# Patient Record
Sex: FEMALE | Race: WHITE | NOT HISPANIC OR LATINO | Employment: FULL TIME | URBAN - METROPOLITAN AREA
[De-identification: names, ages, dates, MRNs, and addresses within clinical notes are randomized per-mention and may not be internally consistent; named-entity substitution may affect disease eponyms.]

---

## 2017-10-27 ENCOUNTER — TRANSCRIBE ORDERS (OUTPATIENT)
Dept: ADMINISTRATIVE | Facility: HOSPITAL | Age: 47
End: 2017-10-27

## 2017-10-27 DIAGNOSIS — Z12.31 VISIT FOR SCREENING MAMMOGRAM: Primary | ICD-10-CM

## 2017-12-07 ENCOUNTER — HOSPITAL ENCOUNTER (OUTPATIENT)
Dept: MAMMOGRAPHY | Facility: HOSPITAL | Age: 47
Discharge: HOME | End: 2017-12-07
Attending: OBSTETRICS & GYNECOLOGY | Admitting: OBSTETRICS & GYNECOLOGY

## 2017-12-07 DIAGNOSIS — Z12.31 VISIT FOR SCREENING MAMMOGRAM: ICD-10-CM

## 2017-12-07 PROCEDURE — 77063 BREAST TOMOSYNTHESIS BI: CPT

## 2017-12-07 PROCEDURE — G0202 SCR MAMMO BI INCL CAD: HCPCS

## 2017-12-08 PROCEDURE — 77063 BREAST TOMOSYNTHESIS BI: CPT | Performed by: RADIOLOGY

## 2017-12-08 PROCEDURE — 77067 SCR MAMMO BI INCL CAD: CPT | Performed by: RADIOLOGY

## 2018-11-09 ENCOUNTER — TRANSCRIBE ORDERS (OUTPATIENT)
Dept: ADMINISTRATIVE | Facility: HOSPITAL | Age: 48
End: 2018-11-09

## 2018-11-09 DIAGNOSIS — Z12.31 VISIT FOR SCREENING MAMMOGRAM: Primary | ICD-10-CM

## 2018-12-26 ENCOUNTER — HOSPITAL ENCOUNTER (OUTPATIENT)
Dept: MAMMOGRAPHY | Facility: HOSPITAL | Age: 48
Discharge: HOME | End: 2018-12-26
Attending: OBSTETRICS & GYNECOLOGY | Admitting: OBSTETRICS & GYNECOLOGY

## 2018-12-26 DIAGNOSIS — Z12.31 VISIT FOR SCREENING MAMMOGRAM: ICD-10-CM

## 2018-12-26 PROCEDURE — 77067 SCR MAMMO BI INCL CAD: CPT | Performed by: RADIOLOGY

## 2018-12-26 PROCEDURE — 77067 SCR MAMMO BI INCL CAD: CPT

## 2018-12-26 PROCEDURE — 77063 BREAST TOMOSYNTHESIS BI: CPT | Performed by: RADIOLOGY

## 2018-12-26 PROCEDURE — 77063 BREAST TOMOSYNTHESIS BI: CPT

## 2019-12-12 ENCOUNTER — TRANSCRIBE ORDERS (OUTPATIENT)
Dept: ADMINISTRATIVE | Facility: HOSPITAL | Age: 49
End: 2019-12-12

## 2019-12-12 DIAGNOSIS — Z12.31 VISIT FOR SCREENING MAMMOGRAM: Primary | ICD-10-CM

## 2020-02-19 ENCOUNTER — HOSPITAL ENCOUNTER (OUTPATIENT)
Dept: MAMMOGRAPHY | Facility: HOSPITAL | Age: 50
Discharge: HOME | End: 2020-02-19
Admitting: OBSTETRICS & GYNECOLOGY

## 2020-02-19 DIAGNOSIS — Z12.31 VISIT FOR SCREENING MAMMOGRAM: ICD-10-CM

## 2020-02-19 PROCEDURE — 77063 BREAST TOMOSYNTHESIS BI: CPT | Performed by: RADIOLOGY

## 2020-02-19 PROCEDURE — 77063 BREAST TOMOSYNTHESIS BI: CPT

## 2020-02-19 PROCEDURE — 77067 SCR MAMMO BI INCL CAD: CPT

## 2020-02-19 PROCEDURE — 77067 SCR MAMMO BI INCL CAD: CPT | Performed by: RADIOLOGY

## 2020-03-25 ENCOUNTER — HOSPITAL ENCOUNTER (OUTPATIENT)
Dept: MAMMOGRAPHY | Facility: HOSPITAL | Age: 50
Discharge: HOME | End: 2020-03-25
Admitting: RADIOLOGY

## 2020-03-25 DIAGNOSIS — R92.8 ABNORMAL MAMMOGRAM: ICD-10-CM

## 2020-03-25 PROCEDURE — 77061 BREAST TOMOSYNTHESIS UNI: CPT | Performed by: RADIOLOGY

## 2020-03-25 PROCEDURE — 77065 DX MAMMO INCL CAD UNI: CPT

## 2020-03-25 PROCEDURE — 77065 DX MAMMO INCL CAD UNI: CPT | Performed by: RADIOLOGY

## 2020-03-25 PROCEDURE — G0279 TOMOSYNTHESIS, MAMMO: HCPCS

## 2021-06-15 ENCOUNTER — TRANSCRIBE ORDERS (OUTPATIENT)
Dept: ADMINISTRATIVE | Facility: HOSPITAL | Age: 51
End: 2021-06-15

## 2021-06-15 DIAGNOSIS — Z12.31 VISIT FOR SCREENING MAMMOGRAM: Primary | ICD-10-CM

## 2021-07-27 ENCOUNTER — HOSPITAL ENCOUNTER (OUTPATIENT)
Dept: MAMMOGRAPHY | Facility: HOSPITAL | Age: 51
Discharge: HOME | End: 2021-07-27
Admitting: OBSTETRICS & GYNECOLOGY

## 2021-07-27 DIAGNOSIS — Z12.31 VISIT FOR SCREENING MAMMOGRAM: ICD-10-CM

## 2021-07-27 PROCEDURE — 77063 BREAST TOMOSYNTHESIS BI: CPT | Performed by: RADIOLOGY

## 2021-07-27 PROCEDURE — 77067 SCR MAMMO BI INCL CAD: CPT | Performed by: RADIOLOGY

## 2021-07-27 PROCEDURE — 77063 BREAST TOMOSYNTHESIS BI: CPT

## 2021-07-27 PROCEDURE — 77067 SCR MAMMO BI INCL CAD: CPT

## 2021-08-23 ENCOUNTER — HOSPITAL ENCOUNTER (OUTPATIENT)
Dept: MAMMOGRAPHY | Facility: HOSPITAL | Age: 51
Discharge: HOME | End: 2021-08-23

## 2021-08-23 ENCOUNTER — HOSPITAL ENCOUNTER (OUTPATIENT)
Dept: ULTRASOUND IMAGING | Facility: HOSPITAL | Age: 51
Discharge: HOME | End: 2021-08-23

## 2021-08-23 DIAGNOSIS — R92.8 ABNORMAL MAMMOGRAM: ICD-10-CM

## 2021-08-23 PROCEDURE — G0279 TOMOSYNTHESIS, MAMMO: HCPCS

## 2021-08-23 PROCEDURE — 76642 ULTRASOUND BREAST LIMITED: CPT

## 2021-08-23 PROCEDURE — 77065 DX MAMMO INCL CAD UNI: CPT

## 2021-08-23 PROCEDURE — 77065 DX MAMMO INCL CAD UNI: CPT | Performed by: RADIOLOGY

## 2021-08-23 PROCEDURE — 77061 BREAST TOMOSYNTHESIS UNI: CPT | Performed by: RADIOLOGY

## 2021-08-23 PROCEDURE — 76642 ULTRASOUND BREAST LIMITED: CPT | Performed by: RADIOLOGY

## 2022-07-26 ENCOUNTER — TRANSCRIBE ORDERS (OUTPATIENT)
Dept: ADMINISTRATIVE | Facility: HOSPITAL | Age: 52
End: 2022-07-26

## 2022-07-26 DIAGNOSIS — Z12.31 VISIT FOR SCREENING MAMMOGRAM: Primary | ICD-10-CM

## 2022-08-27 ENCOUNTER — APPOINTMENT (OUTPATIENT)
Dept: MAMMOGRAPHY | Facility: HOSPITAL | Age: 52
End: 2022-08-27

## 2022-09-20 ENCOUNTER — HOSPITAL ENCOUNTER (OUTPATIENT)
Dept: MAMMOGRAPHY | Facility: HOSPITAL | Age: 52
Discharge: HOME | End: 2022-09-20
Admitting: OBSTETRICS & GYNECOLOGY

## 2022-09-20 DIAGNOSIS — Z12.31 VISIT FOR SCREENING MAMMOGRAM: ICD-10-CM

## 2022-09-20 PROCEDURE — 77067 SCR MAMMO BI INCL CAD: CPT | Performed by: RADIOLOGY

## 2022-09-20 PROCEDURE — 77063 BREAST TOMOSYNTHESIS BI: CPT | Performed by: RADIOLOGY

## 2022-09-20 PROCEDURE — 77063 BREAST TOMOSYNTHESIS BI: CPT

## 2022-09-20 PROCEDURE — 77067 SCR MAMMO BI INCL CAD: CPT

## 2023-08-09 ENCOUNTER — TRANSCRIBE ORDERS (OUTPATIENT)
Dept: ADMINISTRATIVE | Facility: HOSPITAL | Age: 53
End: 2023-08-09
Payer: COMMERCIAL

## 2023-08-09 DIAGNOSIS — Z12.31 VISIT FOR SCREENING MAMMOGRAM: Primary | ICD-10-CM

## 2023-11-01 ENCOUNTER — OFFICE VISIT (OUTPATIENT)
Dept: ORTHOPEDIC SURGERY | Facility: CLINIC | Age: 53
End: 2023-11-01
Payer: COMMERCIAL

## 2023-11-01 VITALS
SYSTOLIC BLOOD PRESSURE: 114 MMHG | DIASTOLIC BLOOD PRESSURE: 82 MMHG | BODY MASS INDEX: 24.36 KG/M2 | HEIGHT: 65 IN | WEIGHT: 146.2 LBS

## 2023-11-01 DIAGNOSIS — M25.512 ACUTE PAIN OF LEFT SHOULDER: Primary | ICD-10-CM

## 2023-11-01 PROCEDURE — 99203 OFFICE O/P NEW LOW 30 MIN: CPT | Performed by: ORTHOPAEDIC SURGERY

## 2023-11-01 RX ORDER — CELECOXIB 200 MG/1
CAPSULE ORAL
COMMUNITY
Start: 2023-10-19

## 2023-11-01 NOTE — PROGRESS NOTES
"    [facility] [place] [facility] Note        Subjective     Pain of the Left Shoulder      HPI    Gina Hernandez is a 53 y.o. female.  Her dog jerked her left arm 3 weeks ago she injured her left shoulder.  She had x-rays.  She works department [employer].  She lives in [place].  No previous injury.  She has pain in the posterior aspect of the shoulder.    History reviewed. No pertinent past medical history.   Past Surgical History:   Procedure Laterality Date   • HYSTERECTOMY  2010   • OOPHORECTOMY Bilateral 2010      Family History   Problem Relation Age of Onset   • Ovarian cancer Maternal Grandmother         DX AGE UNKNOWN   • Cancer Mother         Renal cancer   • Cancer Father         Prostrate cancer   • BRCA 1/2 Neg Hx    • Breast cancer Neg Hx    • Colon cancer Neg Hx    • Endometrial cancer Neg Hx      Social History     Socioeconomic History   • Marital status:    Tobacco Use   • Smoking status: Former     Packs/day: 0.50     Years: 10.00     Additional pack years: 0.00     Total pack years: 5.00     Types: Cigarettes   Substance and Sexual Activity   • Alcohol use: Yes     Alcohol/week: 2.0 standard drinks of alcohol     Types: 2 Cans of beer per week   • Drug use: Never   • Sexual activity: Yes     Partners: Male     Birth control/protection: None     Comment: Total Historectomy at age 40      Current Outpatient Medications on File Prior to Visit   Medication Sig Dispense Refill   • celecoxib (CeleBREX) 200 MG capsule TAKE 1 CAPSULE BY MOUTH TWICE DAILY WITH FOOD AS NEEDED FOR PAIN       No current facility-administered medications on file prior to visit.      No Known Allergies       Review of Systems     I reviewed the patient's chief complaint, history of present illness, review of systems, past medical history, surgical history, family history, social history, medications and allergy list.        Objective      Physical Exam  /82   Ht 163.8 cm (64.5\")   Wt 66.3 kg (146 lb 3.2 oz)   " BMI 24.71 kg/m²     Body mass index is 24.71 kg/m².    General  Mental Status - alert  General Appearance - cooperative, well groomed, not in acute distress  Orientation - Oriented X3  Build & Nutrition - well developed and well nourished  Posture - normal posture  Gait - normal gait       Ortho Exam  Left shoulder positive impingement.  Full range of motion.  Supraspinatus strength 4-5.  Positive drop arm test.    Imaging/Studies  Imaging Results (Last 24 Hours)       ** No results found for the last 24 hours. **        I viewed in person interpreted x-rays from October 20 which are negative      Assessment    Assessment:  1. Acute pain of left shoulder        Plan:  Continue over-the-counter medication as needed for discomfort  I have ordered physical therapy.  She will do that in [place].  I have ordered an MRI of the left shoulder rule out rotator cuff tear.        Castro Mondragon MD  11/01/23  15:22 EDT      Dictated Utilizing Dragon Dictation.

## 2023-11-09 ENCOUNTER — HOSPITAL ENCOUNTER (OUTPATIENT)
Dept: MAMMOGRAPHY | Facility: HOSPITAL | Age: 53
Discharge: HOME | End: 2023-11-09
Admitting: OBSTETRICS & GYNECOLOGY
Payer: COMMERCIAL

## 2023-11-09 DIAGNOSIS — Z12.31 VISIT FOR SCREENING MAMMOGRAM: ICD-10-CM

## 2023-11-09 PROCEDURE — 77063 BREAST TOMOSYNTHESIS BI: CPT

## 2023-11-09 PROCEDURE — 77067 SCR MAMMO BI INCL CAD: CPT

## 2023-11-15 DIAGNOSIS — M25.512 ACUTE PAIN OF LEFT SHOULDER: ICD-10-CM

## 2023-11-20 ENCOUNTER — OFFICE VISIT (OUTPATIENT)
Dept: ORTHOPEDIC SURGERY | Facility: CLINIC | Age: 53
End: 2023-11-20
Payer: COMMERCIAL

## 2023-11-20 VITALS
WEIGHT: 146.16 LBS | DIASTOLIC BLOOD PRESSURE: 82 MMHG | SYSTOLIC BLOOD PRESSURE: 118 MMHG | BODY MASS INDEX: 24.95 KG/M2 | HEIGHT: 64 IN

## 2023-11-20 DIAGNOSIS — M25.512 ACUTE PAIN OF LEFT SHOULDER: Primary | ICD-10-CM

## 2023-11-20 DIAGNOSIS — M75.92 SUPRASPINATUS TENDINITIS, LEFT: ICD-10-CM

## 2023-11-20 DIAGNOSIS — R59.0 AXILLARY LYMPHADENOPATHY: ICD-10-CM

## 2023-11-20 PROCEDURE — 20610 DRAIN/INJ JOINT/BURSA W/O US: CPT | Performed by: ORTHOPAEDIC SURGERY

## 2023-11-20 PROCEDURE — 99214 OFFICE O/P EST MOD 30 MIN: CPT | Performed by: ORTHOPAEDIC SURGERY

## 2023-11-20 RX ORDER — BUPIVACAINE HYDROCHLORIDE 5 MG/ML
4 INJECTION, SOLUTION EPIDURAL; INTRACAUDAL
Status: COMPLETED | OUTPATIENT
Start: 2023-11-20 | End: 2023-11-20

## 2023-11-20 RX ORDER — LIDOCAINE HYDROCHLORIDE 10 MG/ML
4 INJECTION, SOLUTION EPIDURAL; INFILTRATION; INTRACAUDAL; PERINEURAL
Status: COMPLETED | OUTPATIENT
Start: 2023-11-20 | End: 2023-11-20

## 2023-11-20 RX ORDER — MELOXICAM 15 MG/1
TABLET ORAL
Qty: 90 TABLET | Refills: 2 | Status: SHIPPED | OUTPATIENT
Start: 2023-11-20

## 2023-11-20 RX ORDER — TRIAMCINOLONE ACETONIDE 40 MG/ML
40 INJECTION, SUSPENSION INTRA-ARTICULAR; INTRAMUSCULAR
Status: COMPLETED | OUTPATIENT
Start: 2023-11-20 | End: 2023-11-20

## 2023-11-20 RX ADMIN — BUPIVACAINE HYDROCHLORIDE 4 ML: 5 INJECTION, SOLUTION EPIDURAL; INTRACAUDAL at 15:54

## 2023-11-20 RX ADMIN — TRIAMCINOLONE ACETONIDE 40 MG: 40 INJECTION, SUSPENSION INTRA-ARTICULAR; INTRAMUSCULAR at 15:54

## 2023-11-20 RX ADMIN — LIDOCAINE HYDROCHLORIDE 4 ML: 10 INJECTION, SOLUTION EPIDURAL; INFILTRATION; INTRACAUDAL; PERINEURAL at 15:54

## 2023-11-20 NOTE — PROGRESS NOTES
Procedure   - Large Joint Arthrocentesis: L subacromial bursa on 11/20/2023 3:54 PM  Indications: pain  Details: 21 G needle, posterior approach  Medications: 4 mL lidocaine PF 1% 1 %; 40 mg triamcinolone acetonide 40 MG/ML; 4 mL bupivacaine (PF) 0.5 %  Outcome: tolerated well, no immediate complications  Procedure, treatment alternatives, risks and benefits explained, specific risks discussed. Consent was given by the patient. Immediately prior to procedure a time out was called to verify the correct patient, procedure, equipment, support staff and site/side marked as required. Patient was prepped and draped in the usual sterile fashion.

## 2023-11-20 NOTE — PROGRESS NOTES
"    [facility] Note        Subjective     CC: Follow-up (3 week (MRI) follow-up: acute pain of left shoulder)      HPI    Gina Hernandez is a 53 y.o. female.  Her dog jerked her left arm 3 weeks ago she injured her left shoulder.  She had x-rays.  She works department [employer].  She lives in [place].  No previous injury.  She has pain in the posterior aspect of the shoulder.      Overall, patient's symptoms are worse.    ROS:    Constiutional:Pt denies fever, chills, nausea, or vomiting.  MSK:as above        Objective      Past Medical History  History reviewed. No pertinent past medical history.  Social History     Socioeconomic History    Marital status:    Tobacco Use    Smoking status: Former     Packs/day: 0.50     Years: 10.00     Additional pack years: 0.00     Total pack years: 5.00     Types: Cigarettes   Substance and Sexual Activity    Alcohol use: Yes     Alcohol/week: 2.0 standard drinks of alcohol     Types: 2 Cans of beer per week    Drug use: Never    Sexual activity: Yes     Partners: Male     Birth control/protection: None     Comment: Total Historectomy at age 40          Physical Exam  /82   Ht 163.8 cm (64.49\")   Wt 66.3 kg (146 lb 2.6 oz)   BMI 24.71 kg/m²     Body mass index is 24.71 kg/m².    Patient is well nourished and well developed.        Ortho Exam  Left shoulder with decreased motion and positive impingement.  Tender biceps tendon.  No appreciable lymphadenopathy on exam  Imaging/Labs/EMG Reviewed:  Imaging Results (Last 24 Hours)       ** No results found for the last 24 hours. **        I viewed and personally interpreted the MRI of the left shoulder from November 14 which shows rotator cuff tendinopathy, partial subscapularis tear and axillary lymphadenopathy      Assessment    Assessment:  1. Acute pain of left shoulder    2. Supraspinatus tendinitis, left    3. Axillary lymphadenopathy        Plan:  I prescribed Mobic for pain and " inflammation  I injected her left shoulder subacromial space with cortisone. I discussed with the patient the potential benefits of performing a therapeutic injection of the cortisone as well as potential risks including but not limited to infection, swelling, pain, bleeding, bruising, nerve/vessel damage, skin color changes, transient elevation in blood glucose levels, and fat atrophy. After informed consent and verifying correct patient, procedure site, and type of procedure, the area was prepped with Hibiclens, ethyl chloride was used to numb the skin. The patient tolerated the procedure well. There were no complications.  I ordered physical therapy  She just had a mammogram and therefore no apparent breast issues as the cause of the axillary lymphadenopathy      Castro Mondragon MD  11/20/23  15:53 EST      Dictated Utilizing Dragon Dictation.

## 2023-12-13 ENCOUNTER — OFFICE VISIT (OUTPATIENT)
Dept: ORTHOPEDIC SURGERY | Facility: CLINIC | Age: 53
End: 2023-12-13
Payer: COMMERCIAL

## 2023-12-13 VITALS
WEIGHT: 146 LBS | DIASTOLIC BLOOD PRESSURE: 78 MMHG | HEIGHT: 64 IN | BODY MASS INDEX: 24.92 KG/M2 | SYSTOLIC BLOOD PRESSURE: 118 MMHG

## 2023-12-13 DIAGNOSIS — M25.512 ACUTE PAIN OF LEFT SHOULDER: Primary | ICD-10-CM

## 2023-12-13 DIAGNOSIS — M75.92 SUPRASPINATUS TENDINITIS, LEFT: ICD-10-CM

## 2023-12-13 PROCEDURE — 99213 OFFICE O/P EST LOW 20 MIN: CPT | Performed by: ORTHOPAEDIC SURGERY

## 2023-12-13 NOTE — PROGRESS NOTES
"    [facility] [place] [facility] Note        Subjective     CC: Follow-up (3 week follow up - Acute pain of left shoulder)      HPI    Gina Hernandez is a 53 y.o. female. Her dog jerked her left arm 3 weeks ago she injured her left shoulder.  She had x-rays.  She works department [employer].  She lives in [place].  No previous injury.  She has pain in the posterior aspect of the shoulder.       Overall, patient's symptoms are better.  She was [facility] in [place].    ROS:    Constiutional:Pt denies fever, chills, nausea, or vomiting.  MSK:as above        Objective      Past Medical History  No past medical history on file.  Social History     Socioeconomic History    Marital status:    Tobacco Use    Smoking status: Former     Packs/day: 0.50     Years: 10.00     Additional pack years: 0.00     Total pack years: 5.00     Types: Cigarettes   Substance and Sexual Activity    Alcohol use: Yes     Alcohol/week: 2.0 standard drinks of alcohol     Types: 2 Cans of beer per week    Drug use: Never    Sexual activity: Yes     Partners: Male     Birth control/protection: None     Comment: Total Historectomy at age 40          Physical Exam  /78   Ht 163.8 cm (64.49\")   Wt 66.2 kg (146 lb)   BMI 24.68 kg/m²     Body mass index is 24.68 kg/m².    Patient is well nourished and well developed.        Ortho Exam  Left shoulder has full forward elevation and abduction.  She lacks some internal rotation.    Imaging/Labs/EMG Reviewed:  Imaging Results (Last 24 Hours)       ** No results found for the last 24 hours. **             I viewed and personally interpreted the MRI of the left shoulder from November 14 which shows rotator cuff tendinopathy, partial subscapularis tear and axillary lymphadenopathy       Assessment    Assessment:  1. Acute pain of left shoulder    2. Supraspinatus tendinitis, left        Plan:  Recommend over the counter anti-inflammatories for pain and/or swelling  I have ordered " more physical therapy.  She will follow-up in a month.  She should heal without surgical intervention.      Castro Mondragon MD  12/13/23  15:46 EST      Dictated Utilizing Dragon Dictation.

## 2024-09-09 ENCOUNTER — HOSPITAL ENCOUNTER (EMERGENCY)
Dept: HOSPITAL 22 - UTC | Age: 54
Discharge: HOME | End: 2024-09-09
Payer: COMMERCIAL

## 2024-09-09 VITALS — OXYGEN SATURATION: 100 % | HEART RATE: 74 BPM | SYSTOLIC BLOOD PRESSURE: 126 MMHG | DIASTOLIC BLOOD PRESSURE: 74 MMHG

## 2024-09-09 VITALS
SYSTOLIC BLOOD PRESSURE: 123 MMHG | DIASTOLIC BLOOD PRESSURE: 68 MMHG | TEMPERATURE: 98.6 F | HEART RATE: 82 BPM | RESPIRATION RATE: 16 BRPM | OXYGEN SATURATION: 100 %

## 2024-09-09 VITALS
TEMPERATURE: 98.6 F | SYSTOLIC BLOOD PRESSURE: 120 MMHG | DIASTOLIC BLOOD PRESSURE: 59 MMHG | RESPIRATION RATE: 16 BRPM | OXYGEN SATURATION: 98 % | HEART RATE: 80 BPM

## 2024-09-09 VITALS — BODY MASS INDEX: 23.8 KG/M2 | BODY MASS INDEX: 29.8 KG/M2

## 2024-09-09 VITALS
OXYGEN SATURATION: 98 % | RESPIRATION RATE: 20 BRPM | HEART RATE: 69 BPM | TEMPERATURE: 98.6 F | SYSTOLIC BLOOD PRESSURE: 124 MMHG | DIASTOLIC BLOOD PRESSURE: 75 MMHG

## 2024-09-09 VITALS — OXYGEN SATURATION: 100 % | HEART RATE: 79 BPM | DIASTOLIC BLOOD PRESSURE: 68 MMHG | SYSTOLIC BLOOD PRESSURE: 123 MMHG

## 2024-09-09 DIAGNOSIS — S61.101A: Primary | ICD-10-CM
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
## 2024-09-09 PROCEDURE — 73130 X-RAY EXAM OF HAND: CPT

## 2024-09-09 PROCEDURE — 99283 EMERGENCY DEPT VISIT LOW MDM: CPT

## 2024-09-09 PROCEDURE — 11760 REPAIR OF NAIL BED: CPT

## 2024-09-09 RX ADMIN — LIDOCAINE HYDROCHLORIDE AND EPINEPHRINE 1 ML: 10; 10 INJECTION, SOLUTION INFILTRATION; PERINEURAL at 17:19

## 2024-09-09 NOTE — XR_ITS
PROCEDURE INFORMATION:  
Exam: XR Right Hand  
Exam date and time: 9/9/2024 4:49 PM  
Age: 54 years old  
Clinical indication: Pain; Finger(s); Right; Additional info: Distal  
thumb  
injury  
 
TECHNIQUE:  
Imaging protocol: Radiologic exam of the right hand.  
Views: 3 or more views.  
 
COMPARISON:  
No relevant prior studies available.  
 
FINDINGS:  
Bones/joints: Normal.  
Soft tissues: Normal.  
 
IMPRESSION:  
No acute findings.  
 
Electronically signed by João Ibarra MD at 09/09/2024 17:16

## 2024-09-09 NOTE — PC.NURSE
PATIENT SENT TO ER PER KENNEDY CONTE FOR FURTHER EVALUATION. REPORT GIVEN TO DR. EDWARDS PER KENNEDY CONTE. PATIENT AMBULATED TO ER WITH [facility] STAFF AT THIS TIME

## 2024-09-09 NOTE — ED_ITS
Discharge Plan    
Disposition    
Patient Disposition: Home, Self-Care    
    
Condition: Good    
    
Referrals    
Follow up/Referrals:    
Eddi Angulo [Primary Care Provider] - See instructions    
    
Activity Restrictions/Add. Instructions    
Additional Instructions/Restrictions:    
You can follow-up with your PCP or return to ER for any worsening signs or   
symptoms including redness swelling increasing pain or drainage.  Your nail will  
take approximately 9 months to fully regrow.  You can take Tylenol alternating   
with Motrin for any symptomatic pain.  I provided you with a splint while   
everything heals and you may wear as tolerated.    
    
Clinical Impressions    
Clinical Impression:    
 Avulsion of nail of right thumb    
    
    
Instructions    
Patient Instructions:  DI for Nail Avulsion Injury    
    
Print Language    
Print Language: English    
    
Discharge    
ED Provider: Kirt Manrique    
    
    
General Adult HPI    
<Kirt Manrique MD - Last Filed: 09/09/24 21:52>    
General    
Chief complaint: Skin/Abscess/Foreign Body    
Stated complaint: AO 09/08/24 2200 injury right thumb    
Time Seen by Provider: 09/09/24 16:15    
Mode of Arrival: Ambulatory    
Source of Information: Patient    
Limitations: No Limitations    
Description of Symptoms (Recalled from ER Triage Doc. by RN): PATIENT STATES SHE  
HIT HER RIGHT THUMNAIL ON THE METAL POLE OF A TRAMPOLINE LAST NIGHT AND IT   
PARTIALLY TORE HER NAIL OFF    
History of Present Illness    
HPI narrative:     
Patient is 54-year-old with no pertinent past medical history, right-handed who   
presents emergency department for evaluation of trauma to her thumb.  Patient   
was being pulled along by her dog when her dog and advertently went under a   
trampoline and her thumbnail got caught on the middle aspect of the trampoline   
everting it with significant pain.  She has noticed that it was slightly   
dislodged this morning and oozing blood under the nail causing her to present   
here for continued evaluation.  No other acute complaints    
Related Data    
                                    Allergies    
    
    
    
Allergy/AdvReac Type Severity Reaction Status Date / Time    
     
No Known Allergies Allergy   Verified 09/09/24 16:16    
    
    
    
    
PFS    
<Kirt Manrique MD - Last Filed: 09/09/24 21:52>    
[facility]    
Disclaimer:     
The information contained in this section may have been updated after the   
patient was seen, as this information can be updated by other users.    
    
Medical History (Updated 09/09/24 @ 17:45 by PAULINA Silva)    
    
Skin cancer    
Asthma    
    
    
Surgical History (Updated 09/09/24 @ 16:15 by Keeley Montoya RN)    
    
History of appendectomy    
History of tonsillectomy    
History of hysterectomy    
    
    
Social History (Updated 09/09/24 @ 16:28 by DG Dos Santos)    
Smoking Status:  Never smoker     
alcohol intake:  never     
current occupational status:  employed     
Travel in the last 8 weeks:  None     
    
    
    
<Kirt Manrique MD - Last Filed: 09/09/24 21:52>    
ROS Obtained: Yes Systems reviewed as appropriate & no additional complaints   
except as documented    
    
Physical Exam    
<Kirt Manrique MD - Last Filed: 09/09/24 21:52>    
General    
General appearance: alert and in no apparent distress    
Head    
Head exam: atraumatic and normocephalic    
Eye    
Eye exam: Present PERRL and EOMI    
ENT    
ENT exam: Present mucous membranes moist    
Neck    
Neck exam: Present normal inspection    
Chest    
Chest inspection: Present normal inspection and symmetric chest wall rise    
Respiratory    
Respiratory exam: Absent respiratory distress    
Cardiovascular    
Cardiovascular exam: Present regular rate and normal rhythm    
Abdominal Exam    
Abdominal exam: Present soft    
Extremities Exam    
Extremities exam: Present other (Synthetic nail over top of native nail right   
thumb, partially avulsed, oozing blood under the nail.  Sensation intact to   
light touch distally, capillary refill preserved right thumb, no significant   
tenderness with the exception of the nail.)    
Neurological Exam    
Neurological exam: Present alert    
Psychiatric    
Psychiatric exam: Present normal affect    
Skin    
Skin exam: Present warm and dry    
    
Medical Decision Making    
<Kirt Manrique MD - Last Filed: 09/09/24 21:52>    
[facility] Inquiry    
Pt receiving controlled substance: No    
Vital Signs:     
    
                                            
    
    
    
 09/09/24    
16:00 09/09/24    
16:31 09/09/24    
17:00    
     
Temperature 98.6 F 98.6 F     
     
Temperature Source Oral Oral     
     
Pulse Rate   74    
     
Pulse Rate [Left Brachial] 69 80     
     
Respiratory Rate 20 16     
     
Blood Pressure   126/74    
     
Blood Pressure [Left Arm] 124/75 120/59 L     
     
Blood Pressure Mean [Left Arm] 91 79     
     
Blood Pressure Source [Left Arm] Automatic Cuff Automatic Cuff     
     
Blood Pressure Position [Left Arm] Sitting      
     
02 Sat by Pulse Oximetry 98 98 100    
     
Oxygen Delivery Method Room Air Room Air Room Air    
    
    
    
    
 09/09/24    
17:30 09/09/24    
17:50    
     
Temperature  98.6 F    
     
Temperature Source      
     
Pulse Rate 79 82    
     
Pulse Rate [Left Brachial]      
     
Respiratory Rate  16    
     
Blood Pressure 123/68 123/68    
     
Blood Pressure [Left Arm]      
     
Blood Pressure Mean [Left Arm]      
     
Blood Pressure Source [Left Arm]      
     
Blood Pressure Position [Left Arm]      
     
02 Sat by Pulse Oximetry 100     
     
Oxygen Delivery Method Room Air Room Air    
    
    
    
    
Orders (Tests/Meds):     
    
                                 ED MEDICATIONS    
    
    
    
    
Discontinued Medications    
    
    
    
    
Generic Name Dose Route Start Last Admin    
    
  Trade Name Marisela  PRN Reason Stop Dose Admin    
     
Lidocaine/Epinephrine  1 ml  09/09/24 17:19  09/09/24 17:19    
    
  Lidocaine 1% W/Epi 1:100,000 20ml Vial  IM  09/09/24 17:20  1 ml    
    
  ONCE ONE   Administration    
    
    
                                     ORDERS    
    
    
    
 Category Date Time Status    
     
 Hand XR right minimum 3 views [XR hand RT min 3V] Stat Exams  09/09/24 16:47   
Completed    
    
    
    
    
Medical Decision Narrative:     
In summary patient is 54-year-old female past medical history described above   
who presents emergency department for evaluation traumatic injury sustained to   
her thumb.  Patient is hemodynamically stable nontoxic-appearing upon arrival,   
afebrile.  Differential includes tuft fracture, nailbed avulsion, nailbed   
laceration, among others.  Limited workup be conducted with plain films.    
Patient will be blocked and repaired by Nathan Vera.  Plain film informally  
interpreted by me, no acute displaced fracture.  Primary repair performed by   
Nathan Vera with success.  Patient is appropriate for discharge at this   
time.    
    
    
    
<PAULINA Silva - Last Filed: 09/09/24 18:56>    
Vital Signs:     
    
                                            
    
    
    
 09/09/24    
16:00 09/09/24    
16:31 09/09/24    
17:00    
     
Temperature 98.6 F 98.6 F     
     
Temperature Source Oral Oral     
     
Pulse Rate   74    
     
Pulse Rate [Left Brachial] 69 80     
     
Respiratory Rate 20 16     
     
Blood Pressure   126/74    
     
Blood Pressure [Left Arm] 124/75 120/59 L     
     
Blood Pressure Mean [Left Arm] 91 79     
     
Blood Pressure Source [Left Arm] Automatic Cuff Automatic Cuff     
     
Blood Pressure Position [Left Arm] Sitting      
     
02 Sat by Pulse Oximetry 98 98 100    
     
Oxygen Delivery Method Room Air Room Air Room Air    
    
    
    
    
 09/09/24    
17:30 09/09/24    
17:50    
     
Temperature  98.6 F    
     
Temperature Source      
     
Pulse Rate 79 82    
     
Pulse Rate [Left Brachial]      
     
Respiratory Rate  16    
     
Blood Pressure 123/68 123/68    
     
Blood Pressure [Left Arm]      
     
Blood Pressure Mean [Left Arm]      
     
Blood Pressure Source [Left Arm]      
     
Blood Pressure Position [Left Arm]      
     
02 Sat by Pulse Oximetry 100     
     
Oxygen Delivery Method Room Air Room Air    
    
    
    
    
Orders (Tests/Meds):     
    
                                 ED MEDICATIONS    
    
    
    
    
Discontinued Medications    
    
    
    
    
Generic Name Dose Route Start Last Admin    
    
  Trade Name Marisela  PRN Reason Stop Dose Admin    
     
Lidocaine/Epinephrine  1 ml  09/09/24 17:19  09/09/24 17:19    
    
  Lidocaine 1% W/Epi 1:100,000 20ml Vial  IM  09/09/24 17:20  1 ml    
    
  ONCE ONE   Administration    
    
    
                                     ORDERS    
    
    
    
 Category Date Time Status    
     
 Hand XR right minimum 3 views [XR hand RT min 3V] Stat Exams  09/09/24 16:47   
Completed    
    
    
    
    
    
Procedures    
<PAULINA Silva - Last Filed: 09/09/24 18:56>    
Miscellaneous Procedure    
Procedure Performed:     
Completion of nail avulsion and reimplantation of nail    
Patient underwent sterile prep then a digital nerve block was performed of the   
right thumb.  After adequate anesthesia was obtained avulsion was completed   
utilizing curved Prerna forceps.  Examination of the nailbed shows no fracture   
requiring suture.  Nail cleaned of debris nailbed prepped with soap and water   
and then utilizing Dermabond nail was put back in the eponychium and fixed in   
place.    
    
Critical Care    
<PAULINA Silva - Last Filed: 09/09/24 18:56>    
Critical Care Time    
Critical Care Time: No

## 2024-09-09 NOTE — ED_ITS
Discharge Plan    
Referrals    
Follow up/Referrals:    
Eddi Angulo [Primary Care Provider] - See instructions    
    
Print Language    
Print Language: English    
    
Discharge    
ED Provider: Kirt Manrique    
    
    
[facility]    
General    
Stated complaint: AO 09/08/24 2200 injury right thumb    
Mode of Arrival: Ambulatory    
Source of Information: Patient    
Limitations: No Limitations    
Time Seen by Provider: 09/09/24 16:15    
Description of Symptoms (Recalled from Triage Doc. by RN): PATIENT STATES SHE   
HIT HER RIGHT THUMNAIL ON THE METAL POLE OF A TRAMPOLINE LAST NIGHT AND IT   
PARTIALLY TORE HER NAIL OFF    
HEENT Symptoms (Recalled from RN notes): No    
Resp Symptoms (Recalled from RN notes): No    
Skin Symptoms (Recalled from RN notes): Yes    
MS Symptoms (Recalled from RN notes): No    
Functional Status (Recalled from RN notes): WNL    
History of Present Illness    
Provider Complaint: Patient states that last night she took her dog out and it   
was on a retractable leash States that the dog took off and her right thumbnail   
jammed into the metal pole on the trampoline States that it ripped her nail back  
States that she grabbed her thumb and her  said part of the nail was   
outside of the cuticle and she tried to push it back in States today it has been  
hurting her and still having some bleeding from under the nail and she came in   
to get it checked    
Related Data    
                                    Allergies    
    
    
    
Allergy/AdvReac Type Severity Reaction Status Date / Time    
     
No Known Allergies Allergy   Verified 09/09/24 16:16    
    
    
    
Worker's Comp    
Is this a Worker's Comp case?: No    
    
[facility]    
Disclaimer:     
The information contained in this section may have been updated after the   
patient was seen, as this information can be updated by other users.    
    
Medical History (Updated 09/09/24 @ 16:15 by Keeley Montoya RN)    
    
Skin cancer    
Asthma    
    
    
Surgical History (Updated 09/09/24 @ 16:15 by Keeley Montoya RN)    
    
History of appendectomy    
History of tonsillectomy    
History of hysterectomy    
    
    
Social History    
Smoking Status:  Unknown if ever smoked     
alcohol intake:  never     
current occupational status:  employed     
Travel in the last 8 weeks:  None     
    
    
    
ROS Obtained: Yes All systems reviewed & no additional complaints except as   
documented and Yes Systems reviewed as appropriate & no additional complaints   
except as documented    
Constitutional    
Constitutional: Reports system reviewed and no additional complaints, except as   
documented and Reports as per HPI    
ENT    
Ears, Nose, Mouth, and Throat: Reports system reviewed and no additional   
complaints, except as documented and Reports as per HPI    
Cardiovascular    
Cardiovascular: Reports system reviewed and no additional complaints, except as   
documented and Reports as per HPI    
Respiratory    
Respiratory: Reports system reviewed and no additional complaints, except as   
documented and Reports as per HPI    
Gastrointestinal    
Gastrointestingal: Reports system reviewed and no additional complaints, except   
as documented and as per HPI    
Musculoskeletal    
Musculoskeletal: Reports system reviewed and no additional complaints, except as  
documented and Reports as per HPI    
Comments:     
hit right thumbnail against trampoline last night bending nail back, having   
bleeding and pain ever since    
    
Physical Exam    
General    
General appearance: alert and in no apparent distress    
Respiratory    
Respiratory exam: Present normal lung sounds bilaterally; Absent respiratory   
distress or wheezes    
Cardiovascular    
Cardiovascular exam: Present regular rate, normal rhythm and normal heart sounds    
Expanded Upper Extremity Exam    
Right:     
      Hand L/R back image: 2    
    
 1. reports that she bent her nail back last night and tried to push it back   
under cuticle, bleeding noted under nail    
    
    
Neurological Exam    
Neurological exam: Present alert, oriented X3 and normal gait    
    
Medical Decision Making    
Eliazar Inquiry    
Pt receiving controlled substance: No    
Eliazar was queried for this patient: No    
Vital Signs:     
    
                                            
    
    
    
 09/09/24    
16:00    
     
Temperature 98.6 F    
     
Temperature Source Oral    
     
Pulse Rate [Left Brachial] 69    
     
Respiratory Rate 20    
     
Blood Pressure [Left Arm] 124/75    
     
Blood Pressure Mean [Left Arm] 91    
     
Blood Pressure Source [Left Arm] Automatic Cuff    
     
Blood Pressure Position [Left Arm] Sitting    
     
02 Sat by Pulse Oximetry 98    
     
Oxygen Delivery Method Room Air    
    
    
    
    
Medical Decision Narrative:     
nailbed injury, patient reports hit nail against metal on trampoline last night   
and bent her nail back, states she noticed one side of nail appeared to be   
outside of cuticle and she attempted to push it back in but since has been   
having bleeding and pain under the nail Discussed with patient and will transfer  
to the ED for further evaluation and treatment and she agreed Called ED and   
patient was moved to the ED for further treatment

## 2024-09-09 NOTE — [facility].EDGENADL
Discharge Plan
Disposition
Patient Disposition: Home, Self-Care

Condition: Good

Referrals
Follow up/Referrals:
Eddi Angulo [Primary Care Provider] - See instructions

Activity Restrictions/Add. Instructions
Additional Instructions/Restrictions:
You can follow-up with your PCP or return to ER for any worsening signs or symptoms including redness swelling increasing pain or drainage.  Your nail will take approximately 9 months to fully regrow.  You can take Tylenol alternating with Motrin 
for any symptomatic pain.  I provided you with a splint while everything heals and you may wear as tolerated.

Clinical Impressions
Clinical Impression:
 Avulsion of nail of right thumb


Instructions
Patient Instructions:  DI for Nail Avulsion Injury

Print Language
Print Language: English

Discharge
ED Provider: Kirt Manrique


General Adult HPI
<Kirt Manrique MD - Last Filed: 09/09/24 21:52>
General
Chief complaint: Skin/Abscess/Foreign Body
Stated complaint: AO 09/08/24 2200 injury right thumb
Time Seen by Provider: 09/09/24 16:15
Mode of Arrival: Ambulatory
Source of Information: Patient
Limitations: No Limitations
Description of Symptoms (Recalled from ER Triage Doc. by RN): PATIENT STATES SHE HIT HER RIGHT THUMNAIL ON THE METAL POLE OF A TRAMPOLINE LAST NIGHT AND IT PARTIALLY TORE HER NAIL OFF
History of Present Illness
HPI narrative: 
Patient is 54-year-old with no pertinent past medical history, right-handed who presents emergency department for evaluation of trauma to her thumb.  Patient was being pulled along by her dog when her dog and advertently went under a trampoline and 
her thumbnail got caught on the middle aspect of the trampoline everting it with significant pain.  She has noticed that it was slightly dislodged this morning and oozing blood under the nail causing her to present here for continued evaluation.  No 
other acute complaints
Related Data
Allergies

Allergy/AdvReac Type Severity Reaction Status Date / Time
No Known Allergies Allergy   Verified 09/09/24 16:16



PFS
<Kirt Manrique MD - Last Filed: 09/09/24 21:52>
Formerly [facility]
Disclaimer: 
The information contained in this section may have been updated after the patient was seen, as this information can be updated by other users.

Medical History (Updated 09/09/24 @ 17:45 by PAULINA Silva)

Skin cancer
Asthma


Surgical History (Updated 09/09/24 @ 16:15 by Keeley Montoya RN)

History of appendectomy
History of tonsillectomy
History of hysterectomy


Social History (Updated 09/09/24 @ 16:28 by DG Dos Santos)
Smoking Status:  Never smoker 
alcohol intake:  never 
current occupational status:  employed 
Travel in the last 8 weeks:  None 



<Kirt Manrique MD - Last Filed: 09/09/24 21:52>
ROS Obtained: Yes Systems reviewed as appropriate & no additional complaints except as documented

Physical Exam
<Kirt Manrique MD - Last Filed: 09/09/24 21:52>
General
General appearance: alert and in no apparent distress
Head
Head exam: atraumatic and normocephalic
Eye
Eye exam: Present PERRL and EOMI
ENT
ENT exam: Present mucous membranes moist
Neck
Neck exam: Present normal inspection
Chest
Chest inspection: Present normal inspection and symmetric chest wall rise
Respiratory
Respiratory exam: Absent respiratory distress
Cardiovascular
Cardiovascular exam: Present regular rate and normal rhythm
Abdominal Exam
Abdominal exam: Present soft
Extremities Exam
Extremities exam: Present other (Synthetic nail over top of native nail right thumb, partially avulsed, oozing blood under the nail.  Sensation intact to light touch distally, capillary refill preserved right thumb, no significant tenderness with 
the exception of the nail.)
Neurological Exam
Neurological exam: Present alert
Psychiatric
Psychiatric exam: Present normal affect
Skin
Skin exam: Present warm and dry

Medical Decision Making
<Kirt Manrique MD - Last Filed: 09/09/24 21:52>
Eliazar Inquiry
Pt receiving controlled substance: No
Vital Signs: 



 09/09/24
16:00 09/09/24
16:31 09/09/24
17:00
Temperature 98.6 F 98.6 F 
Temperature Source Oral Oral 
Pulse Rate   74
Pulse Rate [Left Brachial] 69 80 
Respiratory Rate 20 16 
Blood Pressure   126/74
Blood Pressure [Left Arm] 124/75 120/59 L 
Blood Pressure Mean [Left Arm] 91 79 
Blood Pressure Source [Left Arm] Automatic Cuff Automatic Cuff 
Blood Pressure Position [Left Arm] Sitting  
02 Sat by Pulse Oximetry 98 98 100
Oxygen Delivery Method Room Air Room Air Room Air

 09/09/24
17:30 09/09/24
17:50
Temperature  98.6 F
Temperature Source  
Pulse Rate 79 82
Pulse Rate [Left Brachial]  
Respiratory Rate  16
Blood Pressure 123/68 123/68
Blood Pressure [Left Arm]  
Blood Pressure Mean [Left Arm]  
Blood Pressure Source [Left Arm]  
Blood Pressure Position [Left Arm]  
02 Sat by Pulse Oximetry 100 
Oxygen Delivery Method Room Air Room Air



Orders (Tests/Meds): 

ED MEDICATIONS


Discontinued Medications

Generic Name Dose Route Start Last Admin
  Trade Name Marisela  PRN Reason Stop Dose Admin
Lidocaine/Epinephrine  1 ml  09/09/24 17:19  09/09/24 17:19
  Lidocaine 1% W/Epi 1:100,000 20ml Vial  IM  09/09/24 17:20  1 ml
  ONCE ONE   Administration

ORDERS

 Category Date Time Status
 Hand XR right minimum 3 views [XR hand RT min 3V] Stat Exams  09/09/24 16:47 Completed



Medical Decision Narrative: 
In summary patient is 54-year-old female past medical history described above who presents emergency department for evaluation traumatic injury sustained to her thumb.  Patient is hemodynamically stable nontoxic-appearing upon arrival, afebrile.  
Differential includes tuft fracture, nailbed avulsion, nailbed laceration, among others.  Limited workup be conducted with plain films.  Patient will be blocked and repaired by Nathan Vera.  Plain film informally interpreted by me, no acute 
displaced fracture.  Primary repair performed by Nathan Vera with success.  Patient is appropriate for discharge at this time.



<PAULINA Silva - Last Filed: 09/09/24 18:56>
Vital Signs: 



 09/09/24
16:00 09/09/24
16:31 09/09/24
17:00
Temperature 98.6 F 98.6 F 
Temperature Source Oral Oral 
Pulse Rate   74
Pulse Rate [Left Brachial] 69 80 
Respiratory Rate 20 16 
Blood Pressure   126/74
Blood Pressure [Left Arm] 124/75 120/59 L 
Blood Pressure Mean [Left Arm] 91 79 
Blood Pressure Source [Left Arm] Automatic Cuff Automatic Cuff 
Blood Pressure Position [Left Arm] Sitting  
02 Sat by Pulse Oximetry 98 98 100
Oxygen Delivery Method Room Air Room Air Room Air

 09/09/24
17:30 09/09/24
17:50
Temperature  98.6 F
Temperature Source  
Pulse Rate 79 82
Pulse Rate [Left Brachial]  
Respiratory Rate  16
Blood Pressure 123/68 123/68
Blood Pressure [Left Arm]  
Blood Pressure Mean [Left Arm]  
Blood Pressure Source [Left Arm]  
Blood Pressure Position [Left Arm]  
02 Sat by Pulse Oximetry 100 
Oxygen Delivery Method Room Air Room Air



Orders (Tests/Meds): 

ED MEDICATIONS


Discontinued Medications

Generic Name Dose Route Start Last Admin
  Trade Name Marisela  PRN Reason Stop Dose Admin
Lidocaine/Epinephrine  1 ml  09/09/24 17:19  09/09/24 17:19
  Lidocaine 1% W/Epi 1:100,000 20ml Vial  IM  09/09/24 17:20  1 ml
  ONCE ONE   Administration

ORDERS

 Category Date Time Status
 Hand XR right minimum 3 views [XR hand RT min 3V] Stat Exams  09/09/24 16:47 Completed




Procedures
<PAULINA Silva - Last Filed: 09/09/24 18:56>
Miscellaneous Procedure
Procedure Performed: 
Completion of nail avulsion and reimplantation of nail
Patient underwent sterile prep then a digital nerve block was performed of the right thumb.  After adequate anesthesia was obtained avulsion was completed utilizing curved Prerna forceps.  Examination of the nailbed shows no fracture requiring 
suture.  Nail cleaned of debris nailbed prepped with soap and water and then utilizing Dermabond nail was put back in the eponychium and fixed in place.

Critical Care
<PAULINA Silva - Last Filed: 09/09/24 18:56>
Critical Care Time
Critical Care Time: No

## 2024-09-09 NOTE — EXP.UTC
Discharge Plan
Referrals
Follow up/Referrals:
Eddi Angulo [Primary Care Provider] - See instructions

Print Language
Print Language: English

Discharge
ED Provider: Kirt Manrique


[facility]
General
Stated complaint: AO 09/08/24 2200 injury right thumb
Mode of Arrival: Ambulatory
Source of Information: Patient
Limitations: No Limitations
Time Seen by Provider: 09/09/24 16:15
Description of Symptoms (Recalled from Triage Doc. by RN): PATIENT STATES SHE HIT HER RIGHT THUMNAIL ON THE METAL POLE OF A TRAMPOLINE LAST NIGHT AND IT PARTIALLY TORE HER NAIL OFF
HEENT Symptoms (Recalled from RN notes): No
Resp Symptoms (Recalled from RN notes): No
Skin Symptoms (Recalled from RN notes): Yes
MS Symptoms (Recalled from RN notes): No
Functional Status (Recalled from RN notes): WNL
History of Present Illness
Provider Complaint: Patient states that last night she took her dog out and it was on a retractable leash States that the dog took off and her right thumbnail jammed into the metal pole on the trampoline States that it ripped her nail back States 
that she grabbed her thumb and her  said part of the nail was outside of the cuticle and she tried to push it back in States today it has been hurting her and still having some bleeding from under the nail and she came in to get it checked
Related Data
Allergies

Allergy/AdvReac Type Severity Reaction Status Date / Time
No Known Allergies Allergy   Verified 09/09/24 16:16


Worker's Comp
Is this a Worker's Comp case?: No

[facility]
Disclaimer: 
The information contained in this section may have been updated after the patient was seen, as this information can be updated by other users.

Medical History (Updated 09/09/24 @ 16:15 by Keeley Montoya RN)

Skin cancer
Asthma


Surgical History (Updated 09/09/24 @ 16:15 by Keeley Montoya RN)

History of appendectomy
History of tonsillectomy
History of hysterectomy


Social History
Smoking Status:  Unknown if ever smoked 
alcohol intake:  never 
current occupational status:  employed 
Travel in the last 8 weeks:  None 



ROS Obtained: Yes All systems reviewed & no additional complaints except as documented and Yes Systems reviewed as appropriate & no additional complaints except as documented
Constitutional
Constitutional: Reports system reviewed and no additional complaints, except as documented and Reports as per HPI
ENT
Ears, Nose, Mouth, and Throat: Reports system reviewed and no additional complaints, except as documented and Reports as per HPI
Cardiovascular
Cardiovascular: Reports system reviewed and no additional complaints, except as documented and Reports as per HPI
Respiratory
Respiratory: Reports system reviewed and no additional complaints, except as documented and Reports as per HPI
Gastrointestinal
Gastrointestingal: Reports system reviewed and no additional complaints, except as documented and as per HPI
Musculoskeletal
Musculoskeletal: Reports system reviewed and no additional complaints, except as documented and Reports as per HPI
Comments: 
hit right thumbnail against trampoline last night bending nail back, having bleeding and pain ever since

Physical Exam
General
General appearance: alert and in no apparent distress
Respiratory
Respiratory exam: Present normal lung sounds bilaterally; Absent respiratory distress or wheezes
Cardiovascular
Cardiovascular exam: Present regular rate, normal rhythm and normal heart sounds
Expanded Upper Extremity Exam
Right: 
      Hand L/R back image: 
 1. reports that she bent her nail back last night and tried to push it back under cuticle, bleeding noted under nail

Neurological Exam
Neurological exam: Present alert, oriented X3 and normal gait

Medical Decision Making
Eliazar Inquiry
Pt receiving controlled substance: No
Eliazar was queried for this patient: No
Vital Signs: 



 09/09/24
16:00
Temperature 98.6 F
Temperature Source Oral
Pulse Rate [Left Brachial] 69
Respiratory Rate 20
Blood Pressure [Left Arm] 124/75
Blood Pressure Mean [Left Arm] 91
Blood Pressure Source [Left Arm] Automatic Cuff
Blood Pressure Position [Left Arm] Sitting
02 Sat by Pulse Oximetry 98
Oxygen Delivery Method Room Air



Medical Decision Narrative: 
nailbed injury, patient reports hit nail against metal on trampoline last night and bent her nail back, states she noticed one side of nail appeared to be outside of cuticle and she attempted to push it back in but since has been having bleeding and 
pain under the nail Discussed with patient and will transfer to the ED for further evaluation and treatment and she agreed Called ED and patient was moved to the ED for further treatment

## 2025-01-14 ENCOUNTER — TRANSCRIBE ORDERS (OUTPATIENT)
Dept: ADMINISTRATIVE | Facility: HOSPITAL | Age: 55
End: 2025-01-14
Payer: COMMERCIAL

## 2025-01-14 DIAGNOSIS — Z12.31 VISIT FOR SCREENING MAMMOGRAM: Primary | ICD-10-CM

## 2025-01-15 LAB
NCCN CRITERIA FLAG: ABNORMAL
TYRER CUZICK SCORE: 6.5

## 2025-01-15 NOTE — PROGRESS NOTES
I left a voice mail and sent a [employer] message requesting a return call to discuss risk assessment results.

## 2025-01-29 ENCOUNTER — HOSPITAL ENCOUNTER (OUTPATIENT)
Facility: HOSPITAL | Age: 55
Discharge: HOME | End: 2025-01-29
Admitting: OBSTETRICS & GYNECOLOGY
Payer: COMMERCIAL

## 2025-01-29 DIAGNOSIS — Z12.31 VISIT FOR SCREENING MAMMOGRAM: ICD-10-CM

## 2025-01-29 PROCEDURE — 77067 SCR MAMMO BI INCL CAD: CPT

## 2025-01-29 PROCEDURE — 77063 BREAST TOMOSYNTHESIS BI: CPT

## 2025-01-30 ENCOUNTER — DOCUMENTATION (OUTPATIENT)
Dept: GENETICS | Facility: HOSPITAL | Age: 55
End: 2025-01-30
Payer: COMMERCIAL

## 2025-02-14 ENCOUNTER — HOSPITAL ENCOUNTER (OUTPATIENT)
Facility: HOSPITAL | Age: 55
Discharge: HOME | End: 2025-02-14
Payer: COMMERCIAL

## 2025-02-14 DIAGNOSIS — R92.8 ABNORMAL MAMMOGRAM: ICD-10-CM

## 2025-02-14 PROCEDURE — 77065 DX MAMMO INCL CAD UNI: CPT

## 2025-02-14 PROCEDURE — 76642 ULTRASOUND BREAST LIMITED: CPT

## 2025-02-14 PROCEDURE — G0279 TOMOSYNTHESIS, MAMMO: HCPCS
